# Patient Record
Sex: FEMALE | Race: WHITE | NOT HISPANIC OR LATINO | Employment: FULL TIME | URBAN - METROPOLITAN AREA
[De-identification: names, ages, dates, MRNs, and addresses within clinical notes are randomized per-mention and may not be internally consistent; named-entity substitution may affect disease eponyms.]

---

## 2021-06-28 ENCOUNTER — TRANSFERRED RECORDS (OUTPATIENT)
Dept: HEALTH INFORMATION MANAGEMENT | Facility: CLINIC | Age: 31
End: 2021-06-28

## 2021-06-29 ENCOUNTER — TRANSFERRED RECORDS (OUTPATIENT)
Dept: HEALTH INFORMATION MANAGEMENT | Facility: CLINIC | Age: 31
End: 2021-06-29

## 2021-06-29 ENCOUNTER — TELEPHONE (OUTPATIENT)
Dept: OBGYN | Facility: CLINIC | Age: 31
End: 2021-06-29

## 2021-06-29 ENCOUNTER — DOCUMENTATION ONLY (OUTPATIENT)
Dept: HEALTH INFORMATION MANAGEMENT | Facility: CLINIC | Age: 31
End: 2021-06-29

## 2021-06-29 DIAGNOSIS — O35.9XX0 KNOWN FETAL ANOMALY, ANTEPARTUM, SINGLE OR UNSPECIFIED FETUS: Primary | ICD-10-CM

## 2021-06-29 RX ORDER — DOXYCYCLINE 100 MG/1
100 CAPSULE ORAL ONCE
Status: CANCELLED | OUTPATIENT
Start: 2021-06-29 | End: 2021-06-29

## 2021-06-29 NOTE — TELEPHONE ENCOUNTER
Patient reached out. Discussed process of scheduling. Advised we are waiting to hear back regarding insurance coverage. Pt verbalized understanding and agreement. Phone call quality became poor and call was disconnected.    Was able to reach patient and her , advised I would reach out tomorrow to follow up with any questions they may have. Pt and spouse verbalized understanding and agreement.

## 2021-06-29 NOTE — PROGRESS NOTES
Referral from [facility]. Hilaria Nickerson is a  at 21w2d with DAVID of 2021 by LMP c/w 8w3d US referred to [facility] for termination due to fetal ONTD.    Women's Right to Know completion date: 2021    Blood type: Rh positive, Luli negative    Mifepristone: yes    Misoprostol: yes/no (depends on provider)    Laminaria: yes    Chromosome testing: Had scheduled amnio 21 at [facility] at [facility]    Remains: hospital/private disposition: Unsure of desires    Desires memento box/footprints: yes/no: Unsure of desires    Echo Wiseman MD

## 2021-06-29 NOTE — H&P (VIEW-ONLY)
Referral from [facility]. Hilaria Nickerson is a  at 21w2d with DAVID of 2021 by LMP c/w 8w3d US referred to [facility] for termination due to fetal ONTD.    Women's Right to Know completion date: 2021    Blood type: Rh positive, Luli negative    Mifepristone: yes    Misoprostol: yes/no (depends on provider)    Laminaria: yes    Chromosome testing: Had scheduled amnio 21 at [facility] at [facility]    Remains: hospital/private disposition: Unsure of desires    Desires memento box/footprints: yes/no: Unsure of desires    Echo Wiseman MD

## 2021-06-29 NOTE — TELEPHONE ENCOUNTER
Call received from Katherine Evans GC with [facility] regarding referral for D&E due to fetal anomalies. Advised Katherine to send records and referral to [facility] and to instruct patient to call to register.     Records received. 24 hour consent signed 21 at 0930. Pt is . Desires TAB d/t fetal diagnosis of spina bifida. Had amniocentesis , does not want to wait for results to proceed with TAB.     Email sent to Paco Ortiz requesting coverage review for procedure. Awaiting response.     Attempted to reach patient, no answer and unable to leave a voicemail.  Will attempt to reach out again at a later time.

## 2021-06-29 NOTE — TELEPHONE ENCOUNTER
[facility] Call Center    Phone Message    May a detailed message be left on voicemail: yes     Reason for Call: Other: pt and her  are calling, they stated Katherine Merritt from [facility] has spoken to the clinic and wanted pt to get registered to get the process started for termination of pregnancy, please call Hilaria, thank you     Action Taken: Message routed to:  [facility] ([facility]): [facility] RN    Travel Screening: Not Applicable

## 2021-06-30 ENCOUNTER — TELEPHONE (OUTPATIENT)
Dept: OBGYN | Facility: CLINIC | Age: 31
End: 2021-06-30

## 2021-06-30 DIAGNOSIS — Z20.822 ENCOUNTER FOR LABORATORY TESTING FOR COVID-19 VIRUS: Primary | ICD-10-CM

## 2021-06-30 DIAGNOSIS — O35.9XX0 KNOWN FETAL ANOMALY, ANTEPARTUM, SINGLE OR UNSPECIFIED FETUS: Primary | ICD-10-CM

## 2021-06-30 DIAGNOSIS — Z11.59 ENCOUNTER FOR SCREENING FOR OTHER VIRAL DISEASES: ICD-10-CM

## 2021-06-30 RX ORDER — MISOPROSTOL 200 UG/1
TABLET ORAL
Qty: 2 TABLET | Refills: 0 | Status: ON HOLD | OUTPATIENT
Start: 2021-06-30 | End: 2021-07-09

## 2021-06-30 NOTE — TELEPHONE ENCOUNTER
Confirmed surgery date / time / location with patient. Arrival time at 0800 . Nothing to eat 8 hours prior to surgery and clear liquids up 2 hours prior. Shower night before and morning of surgery with antiseptic soap.    Pt informed that COVID test will be ordered and should expect call from schedulers to complete this within 72 hours of scheduled procedure.     Gestational Age on date of surgery: 22w5d    Laminaria (Needed if 16 weeks GA or greater): YES    If YES: Patient informed of 2 day procedure: Day one laminaria placement in clinic. Instructed to premedicate with 800 mg ibuprofen one hour prior to appointment. May bring support person to clinic.      CHECKLIST    Medical records received and reviewed by MD: Yes    Insurance Verified (confirm if completed by [facility],, otherwise email [email or web address]): Yes, procedure is covered per Paco OREILLY    24 hour consent completed by provider or referring clinic, scanned to chart: Yes, 6/29/21    [email or web address] Calendar: Yes    Patient notified and pre op information discussed: Yes    COVID test ordered and scheduled: Yes, ordered 6/30/21    Date/Time Laminaria placement:  (Procedure room and MD scheduled 60 minutes; before 12:00 on the day prior to surgery) 7/8/21 at 0830    Date/Time Surgery: 07/09/21 at 1000    Reviewed instructions, what to expect day of laminaria placement and day of D&E. Clinic address, where to park, and contact information given to patient. Discussed administration of misoprostol. Discussed Social Work services, patient and spouse interested in speaking with  prior to appointments to discuss disposition and community resources. Pt and spouse verbalized understanding and agreement, denied further questions/concerns. Encouraged them to reach out with any questions that come up.     Email sent to SW team to request a call to pt prior to scheduled procedures.

## 2021-07-05 ENCOUNTER — TELEPHONE (OUTPATIENT)
Dept: CARE COORDINATION | Facility: CLINIC | Age: 31
End: 2021-07-05

## 2021-07-05 NOTE — TELEPHONE ENCOUNTER
Received nursing referral for SW to contact patient to offer support and to share information about options for fetal disposition.    Phone call to Hilaria this morning.  Shared detailed information about the options for hospital and individual arrangements.  Hilaria is tearful as she processes this loss.  She and her  are leaning toward individual arrangements with cremation but are needing time to think through this.      SW to see patient on date of procedure 7-9-21 to discuss fetal disposition and to provide pregnancy and infant loss resources.

## 2021-07-06 DIAGNOSIS — Z20.822 ENCOUNTER FOR LABORATORY TESTING FOR COVID-19 VIRUS: ICD-10-CM

## 2021-07-06 LAB
SARS-COV-2 RNA RESP QL NAA+PROBE: NORMAL
SPECIMEN SOURCE: NORMAL

## 2021-07-06 PROCEDURE — U0003 INFECTIOUS AGENT DETECTION BY NUCLEIC ACID (DNA OR RNA); SEVERE ACUTE RESPIRATORY SYNDROME CORONAVIRUS 2 (SARS-COV-2) (CORONAVIRUS DISEASE [COVID-19]), AMPLIFIED PROBE TECHNIQUE, MAKING USE OF HIGH THROUGHPUT TECHNOLOGIES AS DESCRIBED BY CMS-2020-01-R: HCPCS | Performed by: OBSTETRICS & GYNECOLOGY

## 2021-07-06 PROCEDURE — U0005 INFEC AGEN DETEC AMPLI PROBE: HCPCS | Performed by: OBSTETRICS & GYNECOLOGY

## 2021-07-07 LAB
LABORATORY COMMENT REPORT: NORMAL
SARS-COV-2 RNA RESP QL NAA+PROBE: NEGATIVE
SPECIMEN SOURCE: NORMAL

## 2021-07-08 ENCOUNTER — OFFICE VISIT (OUTPATIENT)
Dept: OBGYN | Facility: CLINIC | Age: 31
End: 2021-07-08
Attending: OBSTETRICS & GYNECOLOGY
Payer: COMMERCIAL

## 2021-07-08 ENCOUNTER — ALLIED HEALTH/NURSE VISIT (OUTPATIENT)
Dept: OBGYN | Facility: CLINIC | Age: 31
End: 2021-07-08
Payer: COMMERCIAL

## 2021-07-08 VITALS — DIASTOLIC BLOOD PRESSURE: 70 MMHG | SYSTOLIC BLOOD PRESSURE: 113 MMHG | HEART RATE: 75 BPM

## 2021-07-08 VITALS — RESPIRATION RATE: 18 BRPM

## 2021-07-08 DIAGNOSIS — O35.9XX0 PREGNANCY AFFECTED BY MULTIPLE CONGENITAL ANOMALIES OF FETUS, SINGLE OR UNSPECIFIED FETUS: ICD-10-CM

## 2021-07-08 DIAGNOSIS — O35.9XX0 KNOWN FETAL ANOMALY, ANTEPARTUM, SINGLE OR UNSPECIFIED FETUS: Primary | ICD-10-CM

## 2021-07-08 PROCEDURE — 59200 INSERT CERVICAL DILATOR: CPT | Performed by: OBSTETRICS & GYNECOLOGY

## 2021-07-08 PROCEDURE — 250N000013 HC RX MED GY IP 250 OP 250 PS 637: Performed by: OBSTETRICS & GYNECOLOGY

## 2021-07-08 PROCEDURE — 999N000103 HC STATISTIC NO CHARGE FACILITY FEE

## 2021-07-08 PROCEDURE — 64435 NJX AA&/STRD PARACRV NRV: CPT | Mod: 59 | Performed by: OBSTETRICS & GYNECOLOGY

## 2021-07-08 RX ORDER — METRONIDAZOLE 500 MG/1
500 TABLET ORAL ONCE
Status: DISCONTINUED | OUTPATIENT
Start: 2021-07-08 | End: 2021-07-08

## 2021-07-08 RX ORDER — MIFEPRISTONE 200 MG/1
200 TABLET ORAL ONCE
Status: DISCONTINUED | OUTPATIENT
Start: 2021-07-08 | End: 2021-07-08

## 2021-07-08 RX ADMIN — Medication 200 MG: at 10:08

## 2021-07-08 RX ADMIN — METRONIDAZOLE 500 MG: 500 TABLET ORAL at 10:07

## 2021-07-08 ASSESSMENT — PAIN SCALES - GENERAL: PAINLEVEL: NO PAIN (0)

## 2021-07-08 NOTE — PROGRESS NOTES
Pre-Op History & Physical     Name:            Hilaria Nickerson  YOB: 1990              Date of Surgery: 2021  Referred by: Dr. Horne, [facility]    SUBJECTIVE:     Hilaria Nickerson is a 31 year old  at 22w5d on day of procedure 2021 by LMP consistent with 8w3d ultrasound who desires termination of pregnancy.  Ultrasound at 21w1d showed a large myelomeningocele and associated Chiari malformation.      LMP: 2021    OB History   No obstetric history on file.   Blood Type: B+, antibody screen negative     Gynecologic History:   Menarche 13 years  Last pap  NIL  No history of STI, GC/CT neg/neg 2021    Past Medical History:   Diagnosis Date     Healthy adult on routine physical examination        No past surgical history on file.    Medications:  Current Outpatient Medications   Medication Sig Dispense Refill     laminaria japonica (DILATERIA) thin pad (3 mm) Place 1 pad vaginally once for 1 dose All 12 were size large 12 pad 0     misoprostol (CYTOTEC) 200 MCG tablet Take 2 hours before procedure. Place 1 tab between cheek and gum on the right, and 2nd tab on the left. Dissolve for 30 min, then swallow. 2 tablet 0       Allergies:   No Known Allergies    Social History:  Recently moved from [place].  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    ROS:  Significant for appropriate sadness.  All other systems reviewed and are negative.    OBJECTIVE:     Resp 18  There is no height or weight on file to calculate BMI.  General:  Alert, no distress   HEENT:  Normocephalic, without obvious abnormality   Lungs:  Clear to auscultation bilaterally   Heart:  Regular rate and rhythm, no murmur    Abdomen:  Soft, non-tender, non-distended, bowel sounds normal.    Pelvic: -normal external genitalia, no lesions  -vagina normal in appearance, without discharge  -cervix normal in appearance, no lesions  -uterus c/w GA, NT   Extremities: normal   Psychiatric  Normal orientation, mood and affect     Blood Type: B+, antibody screen negative    DILATORS:    Consent:  Details of the cervical dilator insertion procedure were reviewed. Risks, benefits of treatment, and alternate forms of evaluation were discussed.  Patient's questions were elicited and answered.   Written consent was obtained and scanned into medical record.     Verification of Procedure  Just before the procedure begins, through verbal and active participation of team members, I verified:   Initials   Patient Name mip   Patient  mip   Procedure to be performed mip       Local anesthesia used for insertion:  20 cc 1% lidocaine plain  Cervix was serially dilated to 39 Nepali  12 Laminaria Large were inserted with sterile technique.  LOT [id number] Exp 2024-10  1 4 X 4 gauze was placed in the vagina.   Pt tolerated procedure well.  She verbalized understanding that placement of dilators is the start of her procedure.    ASSESSMENT:     31 year old female   at 22w5d weeks on day of procedure 21 by 8week ultrasound with pregnancy complicated by anomaly desiring D&E. Consent reviewed and signed. Patient understands relative risks of options and all questions answered.    PLAN:     -- D&E instructions reviewed and instruction booklet dispensed.   Pathology Exam: routine   Karyotype: had with amnio at [facility] [place] [facility], normal per report   Microarray: had with amnio at [facility], normal per report   Sabianism: did not ask   Memory box: Yes   Tissue Disposition: Hospital   OR Orders:     - Hgb    - Type & screen   12 laminaria placed today, patient received mifepristone in clinic today, plan misoprostol prior to procedure tomorrow.      -- Infection:  Flagyl 500mg  tonight, gc/ct collected No      Women's Right to Know 24 hour consent reviewed and appropriately signed.        Ana Fajardo MD

## 2021-07-08 NOTE — NURSING NOTE
Pt presented to clinic for laminaria placement. Pre procedure instructions and after care reviewed. Reviewed instructions for D&E 7/9. Pt verbalized understanding and agreement, denied further questions/concerns.    Pt tolerated laminaria placement well. Discharged home with partner in stable condition. Encouraged patient to call with any questions or concerns. She verbalized understanding.

## 2021-07-08 NOTE — LETTER
2021       RE: Hilaria Nickerson  [address] [facility] [address]     Dear Colleague,    Thank you for referring your patient, Hilaria Nickerson, to the [facility] [place] [facility]. Please see a copy of my visit note below.      Pre-Op History & Physical     Name:            Hilaria Nickerson  YOB: 1990              Date of Surgery: 2021  Referred by: Dr. Horne, [facility]    SUBJECTIVE:     Hilaria Nickerson is a 31 year old  at 22w5d on day of procedure 2021 by LMP consistent with 8w3d ultrasound who desires termination of pregnancy.  Ultrasound at 21w1d showed a large myelomeningocele and associated Chiari malformation.      LMP: 2021    OB History   No obstetric history on file.   Blood Type: B+, antibody screen negative     Gynecologic History:   Menarche 13 years  Last pap  NIL  No history of STI, GC/CT neg/neg 2021    Past Medical History:   Diagnosis Date     Healthy adult on routine physical examination        No past surgical history on file.    Medications:  Current Outpatient Medications   Medication Sig Dispense Refill     laminaria japonica (DILATERIA) thin pad (3 mm) Place 1 pad vaginally once for 1 dose All 12 were size large 12 pad 0     misoprostol (CYTOTEC) 200 MCG tablet Take 2 hours before procedure. Place 1 tab between cheek and gum on the right, and 2nd tab on the left. Dissolve for 30 min, then swallow. 2 tablet 0       Allergies:   No Known Allergies    Social History:  Recently moved from [place].  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    ROS:  Significant for  appropriate sadness.  All other systems reviewed and are negative.    OBJECTIVE:     Resp 18  There is no height or weight on file to calculate BMI.  General:  Alert, no distress   HEENT:  Normocephalic, without obvious abnormality   Lungs:  Clear to auscultation bilaterally   Heart:  Regular rate and rhythm, no murmur   Abdomen:  Soft, non-tender, non-distended, bowel sounds normal.    Pelvic: -normal external genitalia, no lesions  -vagina normal in appearance, without discharge  -cervix normal in appearance, no lesions  -uterus c/w GA, NT   Extremities: normal   Psychiatric  Normal orientation, mood and affect     Blood Type: B+, antibody screen negative    DILATORS:    Consent:  Details of the cervical dilator insertion procedure were reviewed. Risks, benefits of treatment, and alternate forms of evaluation were discussed.  Patient's questions were elicited and answered.   Written consent was obtained and scanned into medical record.     Verification of Procedure  Just before the procedure begins, through verbal and active participation of team members, I verified:   Initials   Patient Name mip   Patient  mip   Procedure to be performed mip       Local anesthesia used for insertion:  20 cc 1% lidocaine plain  Cervix was serially dilated to 39 Bengali  12 Laminaria Large were inserted with sterile technique.  LOT [id number] Exp 2024-10  1 4 X 4 gauze was placed in the vagina.   Pt tolerated procedure well.  She verbalized understanding that placement of dilators is the start of her procedure.    ASSESSMENT:     31 year old female   at 22w5d weeks on day of procedure 21 by 8week ultrasound with pregnancy complicated by anomaly desiring D&E. Consent reviewed and signed. Patient understands relative risks of options and all questions answered.    PLAN:     -- D&E instructions reviewed and instruction booklet dispensed.   Pathology Exam: routine   Karyotype: had with [place] [facility], normal per  report   [place]array: [place] [facility] per [place]   Protestant: did not ask   Memory box: Yes   Tissue Disposition: Hospital   OR Orders:     - Hgb    - Type & screen   12 laminaria placed today, patient received mifepristone in clinic today, plan misoprostol prior to procedure tomorrow.      -- Infection:  Flagyl 500mg tonight, gc/ct collected No      Women's Right to Know 24 hour consent reviewed and appropriately signed.        Ana Fajardo MD

## 2021-07-08 NOTE — PATIENT INSTRUCTIONS
Try to rest tonight.  If the gauze or cervical dilators fall out, count how many fell out and let your team know in the morning.  If you have pain, you may take 600-800 mg of ibuprofen every 6-8 hours, 1-2 Percocet by mouth every 4-6 hours or the pain medication given to you by your provider.  Take your dose of antibiotic as directed.  Take your misoprostol (Cytotec) 2 hours before your scheduled surgery start time if you were prescribed it.  If you have severe pain or cramping tonight or heavy bleeding-soaking more than a pad an hour-please call the main clinic number, [phone].  If it is after clinic hours, remain on the line to speak with an  who will have the on call physician for [facility] call you back.

## 2021-07-08 NOTE — H&P (VIEW-ONLY)
Pre-Op History & Physical     Name:            Hilaria Nickerson  YOB: 1990              Date of Surgery: 2021  Referred by: Dr. Horne, [facility]    SUBJECTIVE:     Hilaria Nickerson is a 31 year old  at 22w5d on day of procedure 2021 by LMP consistent with 8w3d ultrasound who desires termination of pregnancy.  Ultrasound at 21w1d showed a large myelomeningocele and associated Chiari malformation.      LMP: 2021    OB History   No obstetric history on file.   Blood Type: B+, antibody screen negative     Gynecologic History:   Menarche 13 years  Last pap  NIL  No history of STI, GC/CT neg/neg 2021    Past Medical History:   Diagnosis Date     Healthy adult on routine physical examination        No past surgical history on file.    Medications:  Current Outpatient Medications   Medication Sig Dispense Refill     laminaria japonica (DILATERIA) thin pad (3 mm) Place 1 pad vaginally once for 1 dose All 12 were size large 12 pad 0     misoprostol (CYTOTEC) 200 MCG tablet Take 2 hours before procedure. Place 1 tab between cheek and gum on the right, and 2nd tab on the left. Dissolve for 30 min, then swallow. 2 tablet 0       Allergies:   No Known Allergies    Social History:  Recently moved from [place].  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    ROS:  Significant for appropriate sadness.  All other systems reviewed and are negative.    OBJECTIVE:     Resp 18  There is no height or weight on file to calculate BMI.  General:  Alert, no distress   HEENT:  Normocephalic, without obvious abnormality   Lungs:  Clear to auscultation bilaterally   Heart:  Regular rate and rhythm, no murmur    Abdomen:  Soft, non-tender, non-distended, bowel sounds normal.    Pelvic: -normal external genitalia, no lesions  -vagina normal in appearance, without discharge  -cervix normal in appearance, no lesions  -uterus c/w GA, NT   Extremities: normal   Psychiatric  Normal orientation, mood and affect     Blood Type: B+, antibody screen negative    DILATORS:    Consent:  Details of the cervical dilator insertion procedure were reviewed. Risks, benefits of treatment, and alternate forms of evaluation were discussed.  Patient's questions were elicited and answered.   Written consent was obtained and scanned into medical record.     Verification of Procedure  Just before the procedure begins, through verbal and active participation of team members, I verified:   Initials   Patient Name mip   Patient  mip   Procedure to be performed mip       Local anesthesia used for insertion:  20 cc 1% lidocaine plain  Cervix was serially dilated to 39 Amharic  12 Laminaria Large were inserted with sterile technique.  LOT [id number] 2024-10  1 4 X 4 gauze was placed in the vagina.   Pt tolerated procedure well.  She verbalized understanding that placement of dilators is the start of her procedure.    ASSESSMENT:     31 year old female   at 22w5d weeks on day of procedure 21 by 8week ultrasound with pregnancy complicated by anomaly desiring D&E. Consent reviewed and signed. Patient understands relative risks of options and all questions answered.    PLAN:     -- D&E instructions reviewed and instruction booklet dispensed.   Pathology Exam: routine   Karyotype: had with amnio at [facility], normal per report   Microarray: had with amnio at [facility], normal per report   Episcopal: did not ask   Memory box: Yes   Tissue Disposition: Hospital   OR Orders:     - Hgb    - Type & screen   12 laminaria placed today, patient received mifepristone in clinic today, plan misoprostol prior to procedure tomorrow.      -- Infection:  Flagyl 500mg  tonight, gc/ct collected No      Women's Right to Know 24 hour consent reviewed and appropriately signed.        Ana Fajardo MD

## 2021-07-09 ENCOUNTER — ANESTHESIA (OUTPATIENT)
Dept: SURGERY | Facility: CLINIC | Age: 31
End: 2021-07-09
Payer: COMMERCIAL

## 2021-07-09 ENCOUNTER — ANESTHESIA EVENT (OUTPATIENT)
Dept: SURGERY | Facility: CLINIC | Age: 31
End: 2021-07-09
Payer: COMMERCIAL

## 2021-07-09 ENCOUNTER — DOCUMENTATION ONLY (OUTPATIENT)
Dept: OBGYN | Facility: CLINIC | Age: 31
End: 2021-07-09

## 2021-07-09 ENCOUNTER — HOSPITAL ENCOUNTER (OUTPATIENT)
Facility: CLINIC | Age: 31
Discharge: HOME | End: 2021-07-09
Attending: OBSTETRICS & GYNECOLOGY | Admitting: OBSTETRICS & GYNECOLOGY
Payer: COMMERCIAL

## 2021-07-09 VITALS
HEIGHT: 67 IN | SYSTOLIC BLOOD PRESSURE: 107 MMHG | HEART RATE: 69 BPM | WEIGHT: 147.93 LBS | TEMPERATURE: 97.6 F | BODY MASS INDEX: 23.22 KG/M2 | RESPIRATION RATE: 16 BRPM | DIASTOLIC BLOOD PRESSURE: 76 MMHG | OXYGEN SATURATION: 97 %

## 2021-07-09 DIAGNOSIS — O35.9XX0 KNOWN FETAL ANOMALY, ANTEPARTUM, SINGLE OR UNSPECIFIED FETUS: ICD-10-CM

## 2021-07-09 DIAGNOSIS — O09.291: Primary | ICD-10-CM

## 2021-07-09 LAB
ABO + RH BLD: NORMAL
ABO + RH BLD: NORMAL
BASOPHILS # BLD AUTO: 0 10E9/L (ref 0–0.2)
BASOPHILS NFR BLD AUTO: 0.2 %
BLD GP AB SCN SERPL QL: NORMAL
BLOOD BANK CMNT PATIENT-IMP: NORMAL
DIFFERENTIAL METHOD BLD: ABNORMAL
EOSINOPHIL # BLD AUTO: 0 10E9/L (ref 0–0.7)
EOSINOPHIL NFR BLD AUTO: 0.1 %
ERYTHROCYTE [DISTWIDTH] IN BLOOD BY AUTOMATED COUNT: 12.4 % (ref 10–15)
HCT VFR BLD AUTO: 37.6 % (ref 35–47)
HGB BLD-MCNC: 12.6 G/DL (ref 11.7–15.7)
IMM GRANULOCYTES # BLD: 0.1 10E9/L (ref 0–0.4)
IMM GRANULOCYTES NFR BLD: 0.4 %
LYMPHOCYTES # BLD AUTO: 1.4 10E9/L (ref 0.8–5.3)
LYMPHOCYTES NFR BLD AUTO: 10.1 %
MCH RBC QN AUTO: 30.4 PG (ref 26.5–33)
MCHC RBC AUTO-ENTMCNC: 33.5 G/DL (ref 31.5–36.5)
MCV RBC AUTO: 91 FL (ref 78–100)
MONOCYTES # BLD AUTO: 0.8 10E9/L (ref 0–1.3)
MONOCYTES NFR BLD AUTO: 5.7 %
NEUTROPHILS # BLD AUTO: 11.9 10E9/L (ref 1.6–8.3)
NEUTROPHILS NFR BLD AUTO: 83.5 %
NRBC # BLD AUTO: 0 10*3/UL
NRBC BLD AUTO-RTO: 0 /100
PLATELET # BLD AUTO: 205 10E9/L (ref 150–450)
RBC # BLD AUTO: 4.15 10E12/L (ref 3.8–5.2)
SPECIMEN EXP DATE BLD: NORMAL
WBC # BLD AUTO: 14.2 10E9/L (ref 4–11)

## 2021-07-09 PROCEDURE — 258N000003 HC RX IP 258 OP 636: Performed by: NURSE ANESTHETIST, CERTIFIED REGISTERED

## 2021-07-09 PROCEDURE — 710N000012 HC RECOVERY PHASE 2, PER MINUTE: Performed by: OBSTETRICS & GYNECOLOGY

## 2021-07-09 PROCEDURE — 86901 BLOOD TYPING SEROLOGIC RH(D): CPT | Performed by: OBSTETRICS & GYNECOLOGY

## 2021-07-09 PROCEDURE — 250N000009 HC RX 250: Performed by: OBSTETRICS & GYNECOLOGY

## 2021-07-09 PROCEDURE — 999N000141 HC STATISTIC PRE-PROCEDURE NURSING ASSESSMENT: Performed by: OBSTETRICS & GYNECOLOGY

## 2021-07-09 PROCEDURE — 250N000011 HC RX IP 250 OP 636: Performed by: NURSE ANESTHETIST, CERTIFIED REGISTERED

## 2021-07-09 PROCEDURE — 86850 RBC ANTIBODY SCREEN: CPT | Performed by: OBSTETRICS & GYNECOLOGY

## 2021-07-09 PROCEDURE — 258N000003 HC RX IP 258 OP 636: Performed by: OBSTETRICS & GYNECOLOGY

## 2021-07-09 PROCEDURE — 272N000001 HC OR GENERAL SUPPLY STERILE: Performed by: OBSTETRICS & GYNECOLOGY

## 2021-07-09 PROCEDURE — 250N000009 HC RX 250: Performed by: NURSE ANESTHETIST, CERTIFIED REGISTERED

## 2021-07-09 PROCEDURE — 88300 SURGICAL PATH GROSS: CPT | Mod: TC | Performed by: OBSTETRICS & GYNECOLOGY

## 2021-07-09 PROCEDURE — 85025 COMPLETE CBC W/AUTO DIFF WBC: CPT | Performed by: OBSTETRICS & GYNECOLOGY

## 2021-07-09 PROCEDURE — 370N000017 HC ANESTHESIA TECHNICAL FEE, PER MIN: Performed by: OBSTETRICS & GYNECOLOGY

## 2021-07-09 PROCEDURE — 360N000075 HC SURGERY LEVEL 2, PER MIN: Performed by: OBSTETRICS & GYNECOLOGY

## 2021-07-09 PROCEDURE — 86900 BLOOD TYPING SEROLOGIC ABO: CPT | Performed by: OBSTETRICS & GYNECOLOGY

## 2021-07-09 PROCEDURE — 88300 SURGICAL PATH GROSS: CPT | Mod: 26 | Performed by: PATHOLOGY

## 2021-07-09 PROCEDURE — 36415 COLL VENOUS BLD VENIPUNCTURE: CPT | Performed by: OBSTETRICS & GYNECOLOGY

## 2021-07-09 PROCEDURE — 250N000011 HC RX IP 250 OP 636: Performed by: OBSTETRICS & GYNECOLOGY

## 2021-07-09 PROCEDURE — 710N000010 HC RECOVERY PHASE 1, LEVEL 2, PER MIN: Performed by: OBSTETRICS & GYNECOLOGY

## 2021-07-09 RX ORDER — FENTANYL CITRATE 50 UG/ML
25-50 INJECTION, SOLUTION INTRAMUSCULAR; INTRAVENOUS
Status: DISCONTINUED | OUTPATIENT
Start: 2021-07-09 | End: 2021-07-09 | Stop reason: HOSPADM

## 2021-07-09 RX ORDER — ACETAMINOPHEN 325 MG/1
975 TABLET ORAL ONCE
Status: DISCONTINUED | OUTPATIENT
Start: 2021-07-09 | End: 2021-07-09 | Stop reason: HOSPADM

## 2021-07-09 RX ORDER — SODIUM CHLORIDE, SODIUM LACTATE, POTASSIUM CHLORIDE, CALCIUM CHLORIDE 600; 310; 30; 20 MG/100ML; MG/100ML; MG/100ML; MG/100ML
INJECTION, SOLUTION INTRAVENOUS CONTINUOUS
Status: DISCONTINUED | OUTPATIENT
Start: 2021-07-09 | End: 2021-07-09 | Stop reason: HOSPADM

## 2021-07-09 RX ORDER — NALOXONE HYDROCHLORIDE 0.4 MG/ML
0.2 INJECTION, SOLUTION INTRAMUSCULAR; INTRAVENOUS; SUBCUTANEOUS
Status: DISCONTINUED | OUTPATIENT
Start: 2021-07-09 | End: 2021-07-09 | Stop reason: HOSPADM

## 2021-07-09 RX ORDER — KETOROLAC TROMETHAMINE 30 MG/ML
INJECTION, SOLUTION INTRAMUSCULAR; INTRAVENOUS PRN
Status: DISCONTINUED | OUTPATIENT
Start: 2021-07-09 | End: 2021-07-09

## 2021-07-09 RX ORDER — LIDOCAINE HYDROCHLORIDE 20 MG/ML
INJECTION, SOLUTION INFILTRATION; PERINEURAL PRN
Status: DISCONTINUED | OUTPATIENT
Start: 2021-07-09 | End: 2021-07-09

## 2021-07-09 RX ORDER — SODIUM CHLORIDE, SODIUM LACTATE, POTASSIUM CHLORIDE, CALCIUM CHLORIDE 600; 310; 30; 20 MG/100ML; MG/100ML; MG/100ML; MG/100ML
INJECTION, SOLUTION INTRAVENOUS CONTINUOUS PRN
Status: DISCONTINUED | OUTPATIENT
Start: 2021-07-09 | End: 2021-07-09

## 2021-07-09 RX ORDER — DOXYCYCLINE 100 MG/1
100 CAPSULE ORAL ONCE
Status: DISCONTINUED | OUTPATIENT
Start: 2021-07-09 | End: 2021-07-09 | Stop reason: CLARIF

## 2021-07-09 RX ORDER — MEPERIDINE HYDROCHLORIDE 25 MG/ML
12.5 INJECTION INTRAMUSCULAR; INTRAVENOUS; SUBCUTANEOUS
Status: DISCONTINUED | OUTPATIENT
Start: 2021-07-09 | End: 2021-07-09 | Stop reason: HOSPADM

## 2021-07-09 RX ORDER — VASOPRESSIN 20 U/ML
INJECTION PARENTERAL PRN
Status: DISCONTINUED | OUTPATIENT
Start: 2021-07-09 | End: 2021-07-09 | Stop reason: HOSPADM

## 2021-07-09 RX ORDER — NALOXONE HYDROCHLORIDE 0.4 MG/ML
0.4 INJECTION, SOLUTION INTRAMUSCULAR; INTRAVENOUS; SUBCUTANEOUS
Status: DISCONTINUED | OUTPATIENT
Start: 2021-07-09 | End: 2021-07-09 | Stop reason: HOSPADM

## 2021-07-09 RX ORDER — ONDANSETRON 4 MG/1
4 TABLET, ORALLY DISINTEGRATING ORAL EVERY 30 MIN PRN
Status: DISCONTINUED | OUTPATIENT
Start: 2021-07-09 | End: 2021-07-09 | Stop reason: HOSPADM

## 2021-07-09 RX ORDER — PROPOFOL 10 MG/ML
INJECTION, EMULSION INTRAVENOUS PRN
Status: DISCONTINUED | OUTPATIENT
Start: 2021-07-09 | End: 2021-07-09

## 2021-07-09 RX ORDER — FOLIC ACID 1 MG/1
4 TABLET ORAL DAILY
Qty: 360 TABLET | Refills: 1 | Status: SHIPPED | OUTPATIENT
Start: 2021-07-09 | End: 2022-01-19

## 2021-07-09 RX ORDER — PROPOFOL 10 MG/ML
INJECTION, EMULSION INTRAVENOUS CONTINUOUS PRN
Status: DISCONTINUED | OUTPATIENT
Start: 2021-07-09 | End: 2021-07-09

## 2021-07-09 RX ORDER — ONDANSETRON 2 MG/ML
4 INJECTION INTRAMUSCULAR; INTRAVENOUS EVERY 30 MIN PRN
Status: DISCONTINUED | OUTPATIENT
Start: 2021-07-09 | End: 2021-07-09 | Stop reason: HOSPADM

## 2021-07-09 RX ORDER — FENTANYL CITRATE 50 UG/ML
INJECTION, SOLUTION INTRAMUSCULAR; INTRAVENOUS PRN
Status: DISCONTINUED | OUTPATIENT
Start: 2021-07-09 | End: 2021-07-09

## 2021-07-09 RX ORDER — DEXAMETHASONE SODIUM PHOSPHATE 4 MG/ML
INJECTION, SOLUTION INTRA-ARTICULAR; INTRALESIONAL; INTRAMUSCULAR; INTRAVENOUS; SOFT TISSUE PRN
Status: DISCONTINUED | OUTPATIENT
Start: 2021-07-09 | End: 2021-07-09

## 2021-07-09 RX ORDER — ONDANSETRON 2 MG/ML
INJECTION INTRAMUSCULAR; INTRAVENOUS PRN
Status: DISCONTINUED | OUTPATIENT
Start: 2021-07-09 | End: 2021-07-09

## 2021-07-09 RX ADMIN — FENTANYL CITRATE 100 MCG: 50 INJECTION, SOLUTION INTRAMUSCULAR; INTRAVENOUS at 11:09

## 2021-07-09 RX ADMIN — SODIUM CHLORIDE, POTASSIUM CHLORIDE, SODIUM LACTATE AND CALCIUM CHLORIDE: 600; 310; 30; 20 INJECTION, SOLUTION INTRAVENOUS at 11:01

## 2021-07-09 RX ADMIN — PROPOFOL 100 MCG/KG/MIN: 10 INJECTION, EMULSION INTRAVENOUS at 11:11

## 2021-07-09 RX ADMIN — DOXYCYCLINE 200 MG: 100 INJECTION, POWDER, LYOPHILIZED, FOR SOLUTION INTRAVENOUS at 11:01

## 2021-07-09 RX ADMIN — MIDAZOLAM 2 MG: 1 INJECTION INTRAMUSCULAR; INTRAVENOUS at 11:01

## 2021-07-09 RX ADMIN — PROPOFOL 100 MG: 10 INJECTION, EMULSION INTRAVENOUS at 12:08

## 2021-07-09 RX ADMIN — KETOROLAC TROMETHAMINE 30 MG: 30 INJECTION, SOLUTION INTRAMUSCULAR at 12:12

## 2021-07-09 RX ADMIN — PROPOFOL 50 MG: 10 INJECTION, EMULSION INTRAVENOUS at 11:13

## 2021-07-09 RX ADMIN — SODIUM CHLORIDE, POTASSIUM CHLORIDE, SODIUM LACTATE AND CALCIUM CHLORIDE: 600; 310; 30; 20 INJECTION, SOLUTION INTRAVENOUS at 12:22

## 2021-07-09 RX ADMIN — MIDAZOLAM 2 MG: 1 INJECTION INTRAMUSCULAR; INTRAVENOUS at 11:09

## 2021-07-09 RX ADMIN — ONDANSETRON 4 MG: 2 INJECTION INTRAMUSCULAR; INTRAVENOUS at 11:19

## 2021-07-09 RX ADMIN — DEXAMETHASONE SODIUM PHOSPHATE 8 MG: 4 INJECTION, SOLUTION INTRAMUSCULAR; INTRAVENOUS at 11:19

## 2021-07-09 RX ADMIN — LIDOCAINE HYDROCHLORIDE 40 MG: 20 INJECTION, SOLUTION INFILTRATION; PERINEURAL at 11:11

## 2021-07-09 ASSESSMENT — MIFFLIN-ST. JEOR: SCORE: 1428.63

## 2021-07-09 NOTE — DISCHARGE INSTRUCTIONS
Same-Day Surgery   Adult Discharge Orders & Instructions     For 24 hours after surgery:  1. Get plenty of rest.  A responsible adult must stay with you for at least 24 hours after you leave the hospital.   2. Pain medication can slow your reflexes. Do not drive or use heavy equipment.  If you have weakness or tingling, don't drive or use heavy equipment until this feeling goes away.  3. Mixing alcohol and pain medication can cause dizziness and slow your breathing. It can even be fatal. Do not drink alcohol while taking pain medication.  4. Avoid strenuous or risky activities.  Ask for help when climbing stairs.   5. You may feel lightheaded.  If so, sit for a few minutes before standing.  Have someone help you get up.   6. If you have nausea (feel sick to your stomach), drink only clear liquids such as apple juice, ginger ale, broth or 7-Up.  Rest may also help.  Be sure to drink enough fluids.  Move to a regular diet as you feel able. Take pain medications with a small amount of solid food, such as toast or crackers, to avoid nausea.   7. A slight fever is normal. Call the doctor if your fever is over 100 F (37.7 C) (taken under the tongue) or lasts longer than 24 hours.  8. You may have a dry mouth, muscle aches, trouble sleeping or a sore throat.  These symptoms should go away after 24 hours.  9. Do not make important or legal decisions.   Pain Management:      1. Take pain medication (if prescribed) for pain as directed by your physician.        2. WARNING: If the pain medication you have been prescribed contains Tylenol  (acetaminophen), DO NOT take additional doses of Tylenol (acetaminophen).     Call your doctor for any of the followin.  Signs of infection (fever, growing tenderness at the surgery site, severe pain, a large amount of drainage or bleeding, foul-smelling drainage, redness, swelling).    2.  It has been over 8 to 10 hours since surgery and you are still not able to urinate (pee).    3.   Headache for over 24 hours.    4.  Numbness, tingling or weakness the day after surgery (if you had spinal anesthesia).  To contact a doctor, call _____________________________________ or:      [phone] and ask for the Resident On Call for:          __________________________________________ (answered 24 hours a day)      Emergency Department:  [facility]: [phone]  [place]: [phone]

## 2021-07-09 NOTE — ANESTHESIA CARE TRANSFER NOTE
Patient: Hilaria Nickerson    Procedure(s):  DILATION AND EVACUATION, UTERUS    Diagnosis: Known fetal anomaly, antepartum, single or unspecified fetus [O35.9XX0]  Diagnosis Additional Information: No value filed.    Anesthesia Type:   General     Note:    Oropharynx: spontaneously breathing  Level of Consciousness: drowsy  Oxygen Supplementation: face mask  Level of Supplemental Oxygen (L/min / FiO2): 10  Independent Airway: airway patency satisfactory and stable  Dentition: dentition unchanged  Vital Signs Stable: post-procedure vital signs reviewed and stable  Report to RN Given: handoff report given  Patient transferred to: PACU    Handoff Report: Identifed the Patient, Identified the Reponsible Provider, Reviewed the pertinent medical history, Discussed the surgical course, Reviewed Intra-OP anesthesia mangement and issues during anesthesia, Set expectations for post-procedure period and Allowed opportunity for questions and acknowledgement of understanding      Vitals: (Last set prior to Anesthesia Care Transfer)  CRNA VITALS  7/9/2021 1154 - 7/9/2021 1231      7/9/2021             Temp:  37  C (98.6  F)     oral        Electronically Signed By: BRISA Lim CRNA  July 9, 2021  12:31 PM

## 2021-07-09 NOTE — OP NOTE
[facility]  OPERATIVE NOTE: DILATION AND EVACUATION     SURGEON: Anuradha Boyce MD MPH  ASSISTANT(S): Kylah Lechuga MD    PRE-OPERATIVE DIAGNOSIS:   1. Single IUP at 22w5d  2. Myelomeningocele, Chiari malformation    POST-OPERATIVE DIAGNOSIS:   Same, s/p procedure    PROCEDURE: EUA, Dilation and Evacuation under ultrasound guidance  ANESTHESIA: MAC, cervical block  EBL: 250 mL   IVF: 800 mL LR   UOP: not assessed  COMPLICATIONS: None apparent   SPECIMEN(S):     ID Type Source Tests Collected by Time Destination   A :  Products of Conception Placenta/ Fragments/ Fetus from Miscarriage or Termination SURGICAL PATHOLOGY EXAM Anuradha Boyce MD 2021 11:02 AM        for gross exam only; patient declined autopsy, cytogenetics, microarray    INDICATIONS: Hialria Nickerson is a 31 year old  at 22w5d by early uls.  Pregnancy was found to have large myelomeningocele and associate Chiari malformation, negative amnio testing.  After counseling regarding these findings, the patient has decided to terminate the pregnancy.  The patient was counseled on risks, benefits and alternatives to the above listed procedure. She received information in compliance with the [place] Woman's Right to Know Act at least 24 hours prior to the procedure. Informed consent was signed prior to the procedure.    FINDINGS:   Normal appearing external genitalia and vaginal mucosa  Cervix 1.5/50/-3 after osmotic dilator removal, uterine size consistent with gestational age  Fetal tissue inspection at end was complete for GA  Thin EMS and fundus firm at end of D&E    PROCEDURE:   The patient was taken to the operating room and deep sedation was administered.  She received IV doxycycline for surgical prophylaxis.  She was then placed in the dorsal lithotomy position and a safety timeout performed. One 4x4 gauze sponge and 12 osmotic dilators (0 Dilapan-S, 12 laminaria) which were placed the prior day  were removed.  The patient also received adjunctive mifepristone (day of dilators) and misoprostol for cervical preparation.  The patient was then prepped and draped in the usual sterile fashion.    A Klopfer speculum was placed into the vagina and the anterior lip of the cervix grasped with a ring forceps.  Cervical anesthesia was injected using a total of 20 cc of 1% polocaine with 5.3 units of vasopressin.  Ultrasound guidance was used during all subsequent intrauterine instrumentation.  The cervix was dilated with Mark dilators to 79 Greenlandic.  A Bierer forceps was used to evacuate placental and fetal tissue.  An 11-curved suction cannula was inserted and suction aspiration was performed until a gritty texture noted throughout the cavity.      The ring forceps was removed from the cervix.  The cervix and vaginal vault were inspected.  Good hemostasis was noted. The instruments were removed from the vagina.  Sponge and needle counts were correct at the close of the case x 2.  After anesthesia reversal, the patient was transferred to the recovery room in stable condition.      The patient was provided with a memory box and foot prints.  A blessing was performed at the patient's request.  Fetal remains will be taken care of by [facility] at the patient's request.    I was present for the entire Dilation and Evacuation procedure.  Anuradha Boyce MD MPH

## 2021-07-09 NOTE — BRIEF OP NOTE
Gynecology Brief Op Note    Hilaria Nickerson  [id number]    Date of Surgery: 2021    Surgeon: Anuradha Boyce MD    Assistants: Kylah Lechuga MD PGY-4    Pre-operative Diagnoses:  at 22w6d, fetal myelomeningocele and Chiari malformation, desire for termination of pregnancy    Post-operative Diagnoses: Same as above, s/p below stated procedure    Procedure: Dilation and evacuation    Anesthesia: MAC    EBL: 250cc  IVF: 900cc  UO: Not measured, patient voided prior to OR    Complications: None  Findings: Bimanual exam with ~22-week sized uterus, 12 laminaria and 1 gauze removed from vagina and cervix dilated to 79 Slovenian. Ultrasound applied with visualization of fetus in transverse presentation. All fetal parts accounted for at completion of procedure, cervix hemostatic.  Specimens: Products of conception    Kylah Lechuga MD  Ob/Gyn Resident, PGY-4  21 12:51 PM

## 2021-07-09 NOTE — ANESTHESIA POSTPROCEDURE EVALUATION
Patient: Hilaria Nickerson    Procedure(s):  DILATION AND EVACUATION, UTERUS    Diagnosis:Known fetal anomaly, antepartum, single or unspecified fetus [O35.9XX0]  Diagnosis Additional Information: No value filed.    Anesthesia Type:  General    Note:  Disposition: Outpatient   Postop Pain Control: Uneventful            Sign Out: Well controlled pain   PONV:    Neuro/Psych: Uneventful            Sign Out: Acceptable/Baseline neuro status   Airway/Respiratory: Uneventful            Sign Out: Acceptable/Baseline resp. status   CV/Hemodynamics: Uneventful            Sign Out: Acceptable CV status; No obvious hypovolemia; No obvious fluid overload   Other NRE:    DID A NON-ROUTINE EVENT OCCUR?            Last vitals:  Vitals:    07/09/21 1230 07/09/21 1245 07/09/21 1300   BP: 105/69 110/69 113/73   Pulse: 78 74 75   Resp: 17 17 17   Temp:      SpO2: 100% 99% 99%       Last vitals prior to Anesthesia Care Transfer:  CRNA VITALS  7/9/2021 1154 - 7/9/2021 1254      7/9/2021             Temp:  37  C (98.6  F)     oral          Electronically Signed By: Zuleima Ma MD  July 9, 2021  1:16 PM

## 2021-07-09 NOTE — PROGRESS NOTES
"Met with patient and spouse this morning to offer support and share resources.  We spoke earlier in the week via phone and writer shared information about options for fetal disposition.    Tre are grieving the loss of their baby girl.  Their tears are reflective of their deep sadness.  They shared they have given their baby the name \"Shruthi\".        As they are processing a range of emotions, both Hilaria and Ashley give voice to feelings of fear related to the surgery today and some feelings of guilt as they proceed to end this complicated pregnancy.  SW provided support and validation of the expression of these feelings.      Restoration tradition has led Tre  to choose hospital arrangements for their baby.  SW shared detailed information about the burial and quarterly memorial service they may wish to attend next month.      SW shared pregnancy and infant loss resources including:    [facility] for on-line support groups  Pregnancy and Postpartum Support of [place] for on-line support and connection to a therapist to help process their grief.    SW provided this family my direct contact information and encouraged them to be in touch with any needs.    "

## 2021-07-09 NOTE — ANESTHESIA PREPROCEDURE EVALUATION
Anesthesia Pre-Procedure Evaluation    Patient: Hilaria Nickerson   MRN: [id number] : 1990        Preoperative Diagnosis: Known fetal anomaly, antepartum, single or unspecified fetus [O35.9XX0]   Procedure : Procedure(s):  DILATION AND EVACUATION, UTERUS     Past Medical History:   Diagnosis Date     Healthy adult on routine physical examination       History reviewed. No pertinent surgical history.   No Known Allergies   Social History     Tobacco Use     Smoking status: Never Smoker     Smokeless tobacco: Never Used   Substance Use Topics     Alcohol use: Not Currently      Wt Readings from Last 1 Encounters:   21 67.1 kg (147 lb 14.9 oz)        Anesthesia Evaluation            ROS/MED HX  ENT/Pulmonary:  - neg pulmonary ROS     Neurologic:  - neg neurologic ROS     Cardiovascular:  - neg cardiovascular ROS     METS/Exercise Tolerance:     Hematologic:  - neg hematologic  ROS     Musculoskeletal:  - neg musculoskeletal ROS     GI/Hepatic:  - neg GI/hepatic ROS     Renal/Genitourinary:  - neg Renal ROS     Endo:  - neg endo ROS     Psychiatric/Substance Use:  - neg psychiatric ROS     Infectious Disease:  - neg infectious disease ROS     Malignancy:  - neg malignancy ROS     Other:  - neg other ROS          Physical Exam    Airway  airway exam normal           Respiratory Devices and Support         Dental  no notable dental history         Cardiovascular   cardiovascular exam normal          Pulmonary   pulmonary exam normal                OUTSIDE LABS:  CBC:   Lab Results   Component Value Date    WBC 14.2 (H) 2021    HGB 12.6 2021    HCT 37.6 2021     2021     BMP: No results found for: NA, POTASSIUM, CHLORIDE, CO2, BUN, CR, GLC  COAGS: No results found for: PTT, INR, FIBR  POC: No results found for: BGM, HCG, HCGS  HEPATIC: No results found for: ALBUMIN, PROTTOTAL, ALT, AST, GGT, ALKPHOS, BILITOTAL, BILIDIRECT, MEGAN  OTHER: No results found for: PH, LACT, A1C, QUINTIN,  PHOS, MAG, LIPASE, AMYLASE, TSH, T4, T3, CRP, SED    Anesthesia Plan    ASA Status:  2   NPO Status:  NPO Appropriate    Anesthesia Type: MAC.     - Reason for MAC: chronic cardiopulmonary disease              Consents    Anesthesia Plan(s) and associated risks, benefits, and realistic alternatives discussed. Questions answered and patient/representative(s) expressed understanding.     - Discussed with:  Patient      - Extended Intubation/Ventilatory Support Discussed: No.      - Patient is DNR/DNI Status: No    Use of blood products discussed: No .     Postoperative Care    Pain management: Multi-modal analgesia.   PONV prophylaxis: Ondansetron (or other 5HT-3), Dexamethasone or Solumedrol     Comments:                Zuleima Ma MD

## 2021-07-14 LAB — COPATH REPORT: NORMAL

## 2021-07-15 ENCOUNTER — HOSPITAL ENCOUNTER (EMERGENCY)
Facility: CLINIC | Age: 31
Discharge: HOME | End: 2021-07-16
Attending: EMERGENCY MEDICINE
Payer: COMMERCIAL

## 2021-07-15 ENCOUNTER — APPOINTMENT (OUTPATIENT)
Dept: ULTRASOUND IMAGING | Facility: CLINIC | Age: 31
End: 2021-07-15
Attending: EMERGENCY MEDICINE
Payer: COMMERCIAL

## 2021-07-15 ENCOUNTER — TELEPHONE (OUTPATIENT)
Dept: OBGYN | Facility: CLINIC | Age: 31
End: 2021-07-15

## 2021-07-15 DIAGNOSIS — O03.4 RETAINED PRODUCTS OF CONCEPTION FOLLOWING ABORTION: ICD-10-CM

## 2021-07-15 DIAGNOSIS — R10.2 PELVIC PAIN IN FEMALE: ICD-10-CM

## 2021-07-15 LAB
ANION GAP SERPL CALCULATED.3IONS-SCNC: 5 MMOL/L (ref 3–14)
BASOPHILS # BLD AUTO: 0 10E3/UL (ref 0–0.2)
BASOPHILS NFR BLD AUTO: 0 %
BUN SERPL-MCNC: 12 MG/DL (ref 7–30)
CALCIUM SERPL-MCNC: 9.5 MG/DL (ref 8.5–10.1)
CHLORIDE BLD-SCNC: 106 MMOL/L (ref 94–109)
CO2 SERPL-SCNC: 27 MMOL/L (ref 20–32)
CREAT SERPL-MCNC: 0.86 MG/DL (ref 0.52–1.04)
EOSINOPHIL # BLD AUTO: 0.1 10E3/UL (ref 0–0.7)
EOSINOPHIL NFR BLD AUTO: 2 %
ERYTHROCYTE [DISTWIDTH] IN BLOOD BY AUTOMATED COUNT: 12 % (ref 10–15)
GFR SERPL CREATININE-BSD FRML MDRD: >90 ML/MIN/1.73M2
GLUCOSE BLD-MCNC: 84 MG/DL (ref 70–99)
HCT VFR BLD AUTO: 40.9 % (ref 35–47)
HGB BLD-MCNC: 13.5 G/DL (ref 11.7–15.7)
IMM GRANULOCYTES # BLD: 0 10E3/UL
IMM GRANULOCYTES NFR BLD: 1 %
LYMPHOCYTES # BLD AUTO: 2.3 10E3/UL (ref 0.8–5.3)
LYMPHOCYTES NFR BLD AUTO: 38 %
MCH RBC QN AUTO: 30.5 PG (ref 26.5–33)
MCHC RBC AUTO-ENTMCNC: 33 G/DL (ref 31.5–36.5)
MCV RBC AUTO: 92 FL (ref 78–100)
MONOCYTES # BLD AUTO: 0.4 10E3/UL (ref 0–1.3)
MONOCYTES NFR BLD AUTO: 6 %
NEUTROPHILS # BLD AUTO: 3.3 10E3/UL (ref 1.6–8.3)
NEUTROPHILS NFR BLD AUTO: 53 %
NRBC # BLD AUTO: 0 10E3/UL
NRBC BLD AUTO-RTO: 0 /100
PLATELET # BLD AUTO: 239 10E3/UL (ref 150–450)
POTASSIUM BLD-SCNC: 4.4 MMOL/L (ref 3.4–5.3)
RBC # BLD AUTO: 4.43 10E6/UL (ref 3.8–5.2)
SODIUM SERPL-SCNC: 138 MMOL/L (ref 133–144)
WBC # BLD AUTO: 6.2 10E3/UL (ref 4–11)

## 2021-07-15 PROCEDURE — 99284 EMERGENCY DEPT VISIT MOD MDM: CPT | Mod: 25 | Performed by: EMERGENCY MEDICINE

## 2021-07-15 PROCEDURE — 36415 COLL VENOUS BLD VENIPUNCTURE: CPT | Performed by: EMERGENCY MEDICINE

## 2021-07-15 PROCEDURE — 80048 BASIC METABOLIC PNL TOTAL CA: CPT | Performed by: EMERGENCY MEDICINE

## 2021-07-15 PROCEDURE — 76856 US EXAM PELVIC COMPLETE: CPT

## 2021-07-15 PROCEDURE — 76830 TRANSVAGINAL US NON-OB: CPT

## 2021-07-15 PROCEDURE — 85025 COMPLETE CBC W/AUTO DIFF WBC: CPT | Performed by: EMERGENCY MEDICINE

## 2021-07-15 PROCEDURE — 36592 COLLECT BLOOD FROM PICC: CPT | Performed by: EMERGENCY MEDICINE

## 2021-07-15 RX ORDER — IBUPROFEN 200 MG
200 TABLET ORAL EVERY 4 HOURS PRN
COMMUNITY

## 2021-07-15 RX ORDER — ACETAMINOPHEN 500 MG
500-1000 TABLET ORAL EVERY 6 HOURS PRN
COMMUNITY

## 2021-07-15 ASSESSMENT — ENCOUNTER SYMPTOMS
NAUSEA: 0
DYSURIA: 0
VOMITING: 0
DIFFICULTY URINATING: 0
HEMATURIA: 0
FEVER: 0
FREQUENCY: 0
ABDOMINAL PAIN: 1

## 2021-07-15 NOTE — TELEPHONE ENCOUNTER
"Call received from patient reporting increased pelvic pain and uterine cramping since D&E on 7/9/21. Pt reports severe cramping that is intermittent. States she gets mild relief from ibuprofen and tylenol. Reports light to moderate bleeding, denies passing clots. States she hasn't checked her temperature, but at times feels \"clammy and sweaty, but chilled\" reports abdominal tenderness as well.    Called and spoke with Dr. Che, recommends evaluation in ED.     Instructed pt to be evaluated in ED today for increased pain and s/s suspicious of infection. Pt verbalized understanding and agreement, denied further questions or concerns.   "

## 2021-07-16 ENCOUNTER — ALLIED HEALTH/NURSE VISIT (OUTPATIENT)
Dept: OBGYN | Facility: CLINIC | Age: 31
End: 2021-07-16
Attending: OBSTETRICS & GYNECOLOGY
Payer: COMMERCIAL

## 2021-07-16 VITALS
HEART RATE: 69 BPM | SYSTOLIC BLOOD PRESSURE: 119 MMHG | BODY MASS INDEX: 22.6 KG/M2 | DIASTOLIC BLOOD PRESSURE: 79 MMHG | WEIGHT: 144 LBS | HEIGHT: 67 IN

## 2021-07-16 VITALS
OXYGEN SATURATION: 99 % | DIASTOLIC BLOOD PRESSURE: 73 MMHG | RESPIRATION RATE: 18 BRPM | SYSTOLIC BLOOD PRESSURE: 109 MMHG | TEMPERATURE: 98.5 F | HEART RATE: 58 BPM | BODY MASS INDEX: 23.13 KG/M2 | WEIGHT: 147.7 LBS

## 2021-07-16 DIAGNOSIS — Z98.890 STATUS POST THERAPEUTIC ABORTION: Primary | ICD-10-CM

## 2021-07-16 PROCEDURE — 99024 POSTOP FOLLOW-UP VISIT: CPT | Performed by: OBSTETRICS & GYNECOLOGY

## 2021-07-16 ASSESSMENT — MIFFLIN-ST. JEOR: SCORE: 1410.81

## 2021-07-16 NOTE — DISCHARGE INSTRUCTIONS
Follow-up with your OB/GYN provider who performed the procedure by phone tomorrow to let them know how you are doing and to have them review the ultrasound report.    Return to the emergency department for fevers, worsening symptoms, significantly increased bleeding, or any other problems.

## 2021-07-16 NOTE — ED PROVIDER NOTES
ED Provider Note  [facility]      History     Chief Complaint   Patient presents with     Post-op Problem     Patient presents to ED with c/o increased lower abd pain. Patient had D&E last friday, and was advised to come in today.      The history is provided by the patient and medical records.     Hilaria Nickerson is a 31 year old  female who was recently hospitalized here on 2021.  When admitted, patient was at 22w5d gestation.  Patient had an ultrasound done at 21w1d gestation that showed a large myelomeningocele and associated Chiari malformation.  Patient desired to terminate the pregnancy and patient underwent a dilation and evacuation on 2021.    Patient presents to the Emergency Department tonight for evaluation of lower abdominal cramping.  Patient reports that this morning she began having severe lower abdominal cramping.  She reports that this cramping has intermittently continued throughout the day today.  She called her OB care team and was told that her pain should be improving and not worsening, so she was told that she should come to the Emergency Department to be evaluated.  Per review of the telephone note, they were concerned that the patient may have an infection.  Patient denies any fevers, nausea, vomiting or urinary symptoms.  She reports that she has had some spotting since procedure, but she reports that she was told that this was to be expected.  Patient reports that her vaginal bleeding has not been worsening.  Patient reports that she was not started on any antibiotics following the procedure.    Past Medical History  Past Medical History:   Diagnosis Date     Healthy adult on routine physical examination      Past Surgical History:   Procedure Laterality Date     DILATION AND EVACUATION N/A 2021    Procedure: DILATION AND EVACUATION, UTERUS;  Surgeon: Anuradha Boyce MD;  Location: [facility] OR     acetaminophen (TYLENOL) 500 MG  tablet  ibuprofen (ADVIL/MOTRIN) 200 MG tablet  folic acid (FOLVITE) 1 MG tablet      No Known Allergies  Family History  Family History   Problem Relation Age of Onset     Hypertension Mother      Diabetes Brother      Diabetes Maternal Grandmother      Hypertension Maternal Grandmother      Social History   Social History     Tobacco Use     Smoking status: Never Smoker     Smokeless tobacco: Never Used   Substance Use Topics     Alcohol use: Not Currently     Drug use: Not Currently      Past medical history, past surgical history, medications, allergies, family history, and social history were reviewed with the patient. No additional pertinent items.       Review of Systems   Constitutional: Negative for fever.   Gastrointestinal: Positive for abdominal pain (lower cramping). Negative for nausea and vomiting.   Genitourinary: Positive for vaginal bleeding (spotting). Negative for decreased urine volume, difficulty urinating, dysuria, frequency, hematuria and urgency.   All other systems reviewed and are negative.      Physical Exam   BP: 122/80  Pulse: 68  Temp: 98.7  F (37.1  C)  Resp: 16  Weight: 67 kg (147 lb 11.2 oz)  SpO2: 99 %  Physical Exam  Vitals and nursing note reviewed.   Constitutional:       General: She is not in acute distress.     Appearance: She is well-developed. She is not ill-appearing, toxic-appearing or diaphoretic.      Comments: Patient is awake and alert, she appears comfortable.  She is mentating normally and protecting her airway without difficulty.   HENT:      Head: Normocephalic and atraumatic.      Mouth/Throat:      Lips: Pink.      Mouth: Mucous membranes are moist.      Pharynx: Oropharynx is clear. No oropharyngeal exudate.   Eyes:      General: Lids are normal. No scleral icterus.     Extraocular Movements: Extraocular movements intact.      Right eye: No nystagmus.      Left eye: No nystagmus.      Conjunctiva/sclera: Conjunctivae normal.      Pupils: Pupils are equal,  round, and reactive to light.   Neck:      Thyroid: No thyromegaly.      Vascular: No JVD.      Trachea: No tracheal deviation.   Cardiovascular:      Rate and Rhythm: Normal rate and regular rhythm.      Pulses: Normal pulses.      Heart sounds: Normal heart sounds. No murmur heard.   No friction rub. No gallop.    Pulmonary:      Effort: Pulmonary effort is normal. No respiratory distress.      Breath sounds: Normal breath sounds.   Abdominal:      General: Bowel sounds are normal. There is no distension.      Palpations: Abdomen is soft. There is no mass.      Tenderness: There is no abdominal tenderness. There is no guarding or rebound.   Musculoskeletal:         General: No tenderness. Normal range of motion.      Cervical back: Normal range of motion and neck supple. No erythema or rigidity.      Right lower leg: No edema.      Left lower leg: No edema.   Lymphadenopathy:      Cervical: No cervical adenopathy.   Skin:     General: Skin is warm and dry.      Capillary Refill: Capillary refill takes less than 2 seconds.      Coloration: Skin is not pale.      Findings: No erythema or rash.   Neurological:      Mental Status: She is alert and oriented to person, place, and time.      Cranial Nerves: No cranial nerve deficit.      Sensory: No sensory deficit.      Motor: Motor function is intact.   Psychiatric:         Mood and Affect: Mood and affect normal.         Speech: Speech normal.         Behavior: Behavior normal.         ED Course      Procedures     8:47 PM  The patient was seen and examined by Portillo Mann MD in Room ED02.                 Results for orders placed or performed during the hospital encounter of 07/15/21   US Pelvic Complete w Transvaginal     Status: None    Narrative    EXAM: US PELVIC COMPLETE WITH TRANSVAGINAL  LOCATION: [facility]  DATE/TIME: 7/15/2021 10:31 PM    INDICATION: Mid pelvic pain post dilatation and evacuation  COMPARISON: None.  TECHNIQUE:  Transabdominal scans were performed. Endovaginal ultrasound was performed to better visualize the adnexa.    FINDINGS:    UTERUS: 10.5 x 4.9 x 7.9 cm. Normal in size and position with no masses.    ENDOMETRIUM: 22 mm. Heterogeneous with increased duplex Doppler flow posteriorly.    RIGHT OVARY: 3.8 x 2.3 x 3.8 cm. 2.0 x 1.2 x 1.3 cm echogenic lesion with adjacent hypoechoic ring. Normal color Doppler within the remainder of ovary.    LEFT OVARY: 3.8 x 3.6 x 2.3 cm. Normal.    No significant free fluid.      Impression    IMPRESSION:  1.  Evidence for retained products of conception.  2.  Echogenic lesion right adnexa nonspecific, possibly recently involuted cyst. Prior would be most helpful, if none follow-up in 6 weeks recommended.         Basic metabolic panel     Status: Normal   Result Value Ref Range    Sodium 138 133 - 144 mmol/L    Potassium 4.4 3.4 - 5.3 mmol/L    Chloride 106 94 - 109 mmol/L    Carbon Dioxide (CO2) 27 20 - 32 mmol/L    Anion Gap 5 3 - 14 mmol/L    Urea Nitrogen 12 7 - 30 mg/dL    Creatinine 0.86 0.52 - 1.04 mg/dL    Calcium 9.5 8.5 - 10.1 mg/dL    Glucose 84 70 - 99 mg/dL    GFR Estimate >90 >60 mL/min/1.73m2   CBC with platelets and differential     Status: None   Result Value Ref Range    WBC Count 6.2 4.0 - 11.0 10e3/uL    RBC Count 4.43 3.80 - 5.20 10e6/uL    Hemoglobin 13.5 11.7 - 15.7 g/dL    Hematocrit 40.9 35.0 - 47.0 %    MCV 92 78 - 100 fL    MCH 30.5 26.5 - 33.0 pg    MCHC 33.0 31.5 - 36.5 g/dL    RDW 12.0 10.0 - 15.0 %    Platelet Count 239 150 - 450 10e3/uL    % Neutrophils 53 %    % Lymphocytes 38 %    % Monocytes 6 %    % Eosinophils 2 %    % Basophils 0 %    % Immature Granulocytes 1 %    NRBCs per 100 WBC 0 <1 /100    Absolute Neutrophils 3.3 1.6 - 8.3 10e3/uL    Absolute Lymphocytes 2.3 0.8 - 5.3 10e3/uL    Absolute Monocytes 0.4 0.0 - 1.3 10e3/uL    Absolute Eosinophils 0.1 0.0 - 0.7 10e3/uL    Absolute Basophils 0.0 0.0 - 0.2 10e3/uL    Absolute Immature Granulocytes 0.0  <=0.0 10e3/uL    Absolute NRBCs 0.0 10e3/uL   CBC with platelets differential     Status: None    Narrative    The following orders were created for panel order CBC with platelets differential.  Procedure                               Abnormality         Status                     ---------                               -----------         ------                     CBC with platelets and d...[[id number]]                      Final result                 Please view results for these tests on the individual orders.     Medications - No data to display     Assessments & Plan (with Medical Decision Making)     This patient had presented to the emergency department with increased cramping following a D&E procedure.  Patient is afebrile, does not have a leukocytosis and abdominal exam is benign decreasing suspicion for endometritis.  Given her benign abdominal exam I also have very low suspicion for uterine perforation and generalized peritonitis.  She is not tachycardic, hypotensive, or anemic decreasing suspicion for significant uterine hemorrhage.  I did consult with gynecology and they recommended obtaining a pelvic ultrasound which demonstrated retained products of conception.  Before they could get down and see the patient, the patient had seen her results on [facility] and requested to go home as it was quite late and she had been waiting for a very long time to get back into the emergency department and then for labs and ultrasound to be completed.  I discussed this with gynecology to let them know the patient was requesting to leave.  Patient assured me that she would follow-up with the gynecology group that performed the procedure in the morning as she may need to schedule a D&C or discuss other options of evacuation.  She will return if she develops fevers, severe bleeding, or any other problems.    I have reviewed the nursing notes. I have reviewed the findings, diagnosis, plan and need for follow up with the  patient.    Discharge Medication List as of 7/16/2021  1:23 AM          Final diagnoses:   Pelvic pain in female       --  I, Gregorio Dewitt, am serving as a trained medical scribe to document services personally performed by Portillo Mann MD, based on the provider's statements to me.     I, Portillo Mann MD, was physically present and have reviewed and verified the accuracy of this note documented by Gregorio Dewitt.    Portillo Mann MD  [facility] EMERGENCY DEPARTMENT  7/15/2021     Portillo Mann MD  07/17/21 1215

## 2021-07-16 NOTE — PROGRESS NOTES
"Hilaria presents to clinic after Emergency Department visit with cramping after D&E.  Patient is s/p D&E on 7/9/21 that went without complication.  On Wednesday 7/14 and Thursday 7/15 she was awoken from sleep at 5:00 with sharp crampy pelvic pain.  She denies heavy bleeding or clots.  No fevers.  She had a large bowel movement yesterday and since then the pain is much better.  She went to the ED yesterday evening but the wait was long and given she was feeling better she left before being seen by OB/GYN.     ROS: 10 point ROS neg other than the symptoms noted above in the HPI.    Past Medical History:   Diagnosis Date     Healthy adult on routine physical examination      Past Surgical History:   Procedure Laterality Date     DILATION AND EVACUATION N/A 7/9/2021    Procedure: DILATION AND EVACUATION, UTERUS;  Surgeon: Anuradha Boyce MD;  Location: [facility] OR     Physical exam:  /79   Pulse 69   Ht 1.702 m (5' 7\")   Wt 65.3 kg (144 lb)   BMI 22.55 kg/m    Gen'l: appears well  Abd: soft, ND, NT  Vulva: normal, no lesions, no blood  Urethra: normal  Vagina: pink, normal rugae, phsyiologic discharge present, no blood  Cervix: closed, no lesion, no bleeding  Uterus: mobile, NT, retroverted, normal in size  Adnexa: no palpable masses, NT  Rectum: Anus normal, no rectovaginal exam done    WBC   Date Value Ref Range Status   07/09/2021 14.2 (H) 4.0 - 11.0 10e9/L Final     WBC Count   Date Value Ref Range Status   07/15/2021 6.2 4.0 - 11.0 10e3/uL Final     Hemoglobin   Date Value Ref Range Status   07/15/2021 13.5 11.7 - 15.7 g/dL Final   07/09/2021 12.6 11.7 - 15.7 g/dL Final   ]  Ultrasound results:  IMPRESSION:  1.  Evidence for retained products of conception.  2.  Echogenic lesion right adnexa nonspecific, possibly recently involuted cyst. Prior would be most helpful, if none follow-up in 6 weeks recommended.    Assessment/Plan:  30 yo  POD#7 s/p D&E at 22w5d with post op cramping now resolved and " possible retained POCs on imaging.    - reviewed images with patient, not clearly retained POCs, but cannot be excluded  - reviewed reassuring exam and resolution of pain observation vs medical treatment (misoprostol) vs suction D&C  - patient elects to continue to observe and will call with recurrence of pain, fever or bleeding.    Ana Fajardo MD

## 2021-07-27 ENCOUNTER — VIRTUAL VISIT (OUTPATIENT)
Dept: OBGYN | Facility: CLINIC | Age: 31
End: 2021-07-27
Payer: COMMERCIAL

## 2021-07-27 DIAGNOSIS — Z09 POSTOPERATIVE FOLLOW-UP: Primary | ICD-10-CM

## 2021-07-27 PROCEDURE — 99213 OFFICE O/P EST LOW 20 MIN: CPT | Mod: 95 | Performed by: OBSTETRICS & GYNECOLOGY

## 2021-07-27 NOTE — PROGRESS NOTES
"[facility]  Follow-Up Visit    The patient has been notified of the following:      \"We have found that certain health care needs can be provided without the need for a face to face visit.  This service lets us provide the care you need with a phone conversation.       I will have full access to your [facility] medical record during this entire phone call.   I will be taking notes for your medical record.      Since this is like an office visit, we will bill your insurance company for this service.       There are potential benefits and risks of telephone visits (e.g. limits to patient confidentiality) that differ from in-person visits.?  Confidentiality still applies for telephone services, and nobody will record the visit.  It is important to be in a quiet, private space that is free of distractions (including cell phone or other devices) during the visit.??      If during the course of the call I believe a telephone visit is not appropriate, you will not be charged for this service\"     Consent has been obtained for this service by care team member: Yes       S: Ms. Hilaria Nickerson is a 31 year old  having a telephone visit for post-operative check after D&E on 2021 for myelomeningocele and associated Chiari malformation that was uncomplicated.    Patient did present to ED on 7/15 for worsening abdominal pain. She had an ultrasound on that day that indicated possible retained products and a possible involuted cyst in her right adnexa with recommendation to follow up in 6 weeks. Patient was seen in clinic by Dr. Fajardo following this visit on . Patient elected to pursue expectant management of possible retained products at that time.    Today, patient states she is doing well. Her pain is resolved, she no longer requires analgesics. She denies fevers, foul-smelling vaginal discharge. Denies dysuria. She states her bleeding is very light. She does state she has some intermittent vaginal irritation " since surgery. She states this is improving with time since surgery. She does have some clear vaginal discharge with her bleeding, cannot say whether this is similar to her prior experience with BV or not.     TVUS 7/15/2021:   IMPRESSION:  1.  Evidence for retained products of conception.  2.  Echogenic lesion right adnexa nonspecific, possibly recently involuted cyst. Prior would be most helpful, if none follow-up in 6 weeks recommended.    A/P:  Ms. Hilaria Nickerson is a 31 year old  via telephone visit for follow up after D&E on 2021. Her surgery was uncomplicated.    # Intermittent vaginal discomfort  - TVUS nonspecific for   - Patient states this problem is overall improving with time. Recommended that patient can make an in-person appointment for pelvic exam to examine for BV, yeast infection, or potentially evaluation for atrophic vaginitis s/p D&E at 22w.    # Chronic intermittent pelvic pain  # Echogenic lesion right adnexa  - Patent states she desires further evaluation of chronic intermittent pelvic pain. Recommended patient could also come in to clinic for more discussion on this with repeat imaging. Not currently having symptoms, no concerning features on TVUS on 7/15.     Estiven Gonzalez MD  Obstetrics, Gynecology, and Women's Health  PGY2  2021 , 12:28 PM      The Patient was seen in [facility] by ESTIVEN GONZALEZ.  I reviewed the history & exam. Assessment and plan were jointly made.    Carole Che MD    Total time: 10 min

## 2021-08-22 ENCOUNTER — HEALTH MAINTENANCE LETTER (OUTPATIENT)
Age: 31
End: 2021-08-22

## 2021-10-17 ENCOUNTER — HEALTH MAINTENANCE LETTER (OUTPATIENT)
Age: 31
End: 2021-10-17

## 2022-01-19 ENCOUNTER — MYC REFILL (OUTPATIENT)
Dept: OBGYN | Facility: CLINIC | Age: 32
End: 2022-01-19
Payer: COMMERCIAL

## 2022-01-19 DIAGNOSIS — O09.291: ICD-10-CM

## 2022-01-19 RX ORDER — FOLIC ACID 1 MG/1
4 TABLET ORAL DAILY
Qty: 360 TABLET | Refills: 1 | Status: SHIPPED | OUTPATIENT
Start: 2022-01-19

## 2022-10-03 ENCOUNTER — HEALTH MAINTENANCE LETTER (OUTPATIENT)
Age: 32
End: 2022-10-03

## 2023-10-21 ENCOUNTER — HEALTH MAINTENANCE LETTER (OUTPATIENT)
Age: 33
End: 2023-10-21

## (undated) DEVICE — SUCTION VACUUM CANISTER STANDARD W/LID&CAPS 003987-901

## (undated) DEVICE — SUCTION CANNULA UTERINE 11MM CVD 022111

## (undated) DEVICE — PAD CHUX UNDERPAD 30X36" P3036C

## (undated) DEVICE — SYR 01ML 27GA 0.5" NDL TBC 309623

## (undated) DEVICE — CATH INTERMITTENT CLEAN-CATH FEMALE 14FR 6" VINYL LF 420614

## (undated) DEVICE — SPECIMEN TRAP VACUUM SUCTION SAFETOUCH 003853-902

## (undated) DEVICE — GLOVE PROTEXIS W/NEU-THERA 6.5  2D73TE65

## (undated) DEVICE — LINEN GOWN X4 5410

## (undated) DEVICE — GLOVE PROTEXIS BLUE W/NEU-THERA 7.0  2D73EB70

## (undated) DEVICE — SOL NACL 0.9% IRRIG 1000ML BOTTLE 2F7124

## (undated) DEVICE — STRAP KNEE/BODY 31143004

## (undated) DEVICE — DECANTER TRANSFER DEVICE 2008S

## (undated) DEVICE — LINEN TOWEL PACK X5 5464

## (undated) DEVICE — Device

## (undated) RX ORDER — DEXAMETHASONE SODIUM PHOSPHATE 4 MG/ML
INJECTION, SOLUTION INTRA-ARTICULAR; INTRALESIONAL; INTRAMUSCULAR; INTRAVENOUS; SOFT TISSUE
Status: DISPENSED
Start: 2021-07-09

## (undated) RX ORDER — LIDOCAINE HYDROCHLORIDE 10 MG/ML
INJECTION, SOLUTION EPIDURAL; INFILTRATION; INTRACAUDAL; PERINEURAL
Status: DISPENSED
Start: 2021-07-09

## (undated) RX ORDER — DIPHENHYDRAMINE HYDROCHLORIDE 50 MG/ML
INJECTION INTRAMUSCULAR; INTRAVENOUS
Status: DISPENSED
Start: 2021-07-09

## (undated) RX ORDER — PROPOFOL 10 MG/ML
INJECTION, EMULSION INTRAVENOUS
Status: DISPENSED
Start: 2021-07-09

## (undated) RX ORDER — VASOPRESSIN 20 U/ML
INJECTION PARENTERAL
Status: DISPENSED
Start: 2021-07-09

## (undated) RX ORDER — LIDOCAINE HYDROCHLORIDE 20 MG/ML
INJECTION, SOLUTION EPIDURAL; INFILTRATION; INTRACAUDAL; PERINEURAL
Status: DISPENSED
Start: 2021-07-09

## (undated) RX ORDER — DOXYCYCLINE 100 MG/1
CAPSULE ORAL
Status: DISPENSED
Start: 2021-07-09

## (undated) RX ORDER — ONDANSETRON 2 MG/ML
INJECTION INTRAMUSCULAR; INTRAVENOUS
Status: DISPENSED
Start: 2021-07-09

## (undated) RX ORDER — FENTANYL CITRATE 50 UG/ML
INJECTION, SOLUTION INTRAMUSCULAR; INTRAVENOUS
Status: DISPENSED
Start: 2021-07-09